# Patient Record
Sex: FEMALE | Race: ASIAN | NOT HISPANIC OR LATINO | URBAN - METROPOLITAN AREA
[De-identification: names, ages, dates, MRNs, and addresses within clinical notes are randomized per-mention and may not be internally consistent; named-entity substitution may affect disease eponyms.]

---

## 2021-04-08 ENCOUNTER — COMMUNICATION - HEALTHEAST (OUTPATIENT)
Dept: SCHEDULING | Facility: CLINIC | Age: 46
End: 2021-04-08

## 2021-06-13 ENCOUNTER — HOSPITAL ENCOUNTER (EMERGENCY)
Dept: EMERGENCY MEDICINE | Facility: HOSPITAL | Age: 46
Discharge: HOME | End: 2021-06-14
Attending: EMERGENCY MEDICINE

## 2021-06-13 DIAGNOSIS — R07.89 CHEST WALL PAIN: ICD-10-CM

## 2021-06-13 DIAGNOSIS — R07.81 RIB PAIN: ICD-10-CM

## 2021-06-13 ASSESSMENT — MIFFLIN-ST. JEOR: SCORE: 1500.44

## 2021-06-16 PROBLEM — H40.053 BILATERAL OCULAR HYPERTENSION: Status: ACTIVE | Noted: 2019-06-25

## 2021-06-16 PROBLEM — E11.9 TYPE 2 DIABETES MELLITUS, WITHOUT LONG-TERM CURRENT USE OF INSULIN (H): Status: ACTIVE | Noted: 2019-04-16

## 2021-06-25 NOTE — ED TRIAGE NOTES
"Pt arrives c/o dry cough x 2 weeks.   1 hour prior to arrival, pt coughed hard and felt a \"pop\" on right lateral ribs. Pt denies shortness of breath but pain is persisting. Worsens with movement.   "

## 2021-06-26 NOTE — ED PROVIDER NOTES
EMERGENCY DEPARTMENT ENCOUNTER      NAME: Belgica Her  AGE: 45 y.o. female  YOB: 1975  MRN: [id number]  EVALUATION DATE & TIME: 6/13/2021 11:24 PM    PCP: Provider, No Primary Care    ED PROVIDER: Ector Lay M.D.      Chief Complaint   Patient presents with     Chest Wall Pain         FINAL IMPRESSION:  1. Chest wall pain    2. Rib pain          ED COURSE & MEDICAL DECISION MAKING:    Pertinent Labs & Imaging studies reviewed. (See chart for details)    11:31 PM  I met with the patient to gather history and to perform my initial exam.  I discussed treatment options and the plan for care while in the Emergency Department.  ED Course as of Jun 14 0040   Sun Jun 13, 2021   2334 Patient is a 45-year-old woman with history of type 2 diabetes who presents with a chronic cough for the past couple of weeks with auditory pop and pain in her right anterior chest wall this evening. On exam she is comfortable as long she does not take a deep breath or cough, has tenderness to the rib cage that does not track around to the lateral or posterior aspects. She declines any analgesia, plan to get an x-ray to rule out obvious fracture or pneumothorax, otherwise there is likely tendinous or ligamentous strain or injury that is causing her pain.    [OG]   Mon Jun 14, 2021   0033 Cardiomediastinal silhouette within normal limits. No focal consolidation, pleural effusion or visible pneumothorax. Degenerative change osseous structures.   XR Chest 2 Views [OG]   0039 After the patient, we discussed possibility of rib contusion or at the very worst subtle rib fracture.  We discussed pain management, expected pain resolution over the next either 7 to 10 days or possibly 3 weeks of the latter is true.    [OG]      ED Course User Index  [OG] Ector Lay MD       At the conclusion of the encounter I discussed the results of all of the tests and the disposition. The questions were answered. The patient or family  acknowledged understanding and was agreeable with the care plan.       MEDICATIONS GIVEN IN THE EMERGENCY:  Medications - No data to display    NEW PRESCRIPTIONS STARTED AT TODAY'S ER VISIT  There are no discharge medications for this patient.           =================================================================    HPI    Patient information was obtained from: patient        Belgica Her is a 45 y.o. female with a pertinent history of DM II who presents to this ED for evaluation of cough, rib pain. Patient's had a persistent cough for the past couple of weeks, and this evening noticed when she coughs she heard a pop in her right lower chest. It was instantly painful. Since then she has had persistent 6 out of 10 pain, worse when she tries to cough or takes a deep breath. It does not radiate to the back. It can also be provoked by some twisting motions. She took Advil which helps a minimal amount. No fever.      REVIEW OF SYSTEMS   Review of Systems see HPI    PAST MEDICAL HISTORY:  No past medical history on file.    PAST SURGICAL HISTORY:  No past surgical history on file.    CURRENT MEDICATIONS:    No current facility-administered medications on file prior to encounter.      No current outpatient medications on file prior to encounter.       ALLERGIES:  No Known Allergies    FAMILY HISTORY:  No family history on file.    SOCIAL HISTORY:   Social History     Socioeconomic History     Marital status:      Spouse name: Not on file     Number of children: Not on file     Years of education: Not on file     Highest education level: Not on file   Occupational History     Not on file   Social Needs     Financial resource strain: Not on file     Food insecurity     Worry: Not on file     Inability: Not on file     Transportation needs     Medical: Not on file     Non-medical: Not on file   Tobacco Use     Smoking status: Not on file   Substance and Sexual Activity     Alcohol use: Not on file     Drug use: Not  "on file     Sexual activity: Not on file   Lifestyle     Physical activity     Days per week: Not on file     Minutes per session: Not on file     Stress: Not on file   Relationships     Social connections     Talks on phone: Not on file     Gets together: Not on file     Attends Holiness service: Not on file     Active member of club or organization: Not on file     Attends meetings of clubs or organizations: Not on file     Relationship status: Not on file     Intimate partner violence     Fear of current or ex partner: Not on file     Emotionally abused: Not on file     Physically abused: Not on file     Forced sexual activity: Not on file   Other Topics Concern     Not on file   Social History Narrative     Not on file       VITALS:  Patient Vitals for the past 24 hrs:   BP Temp Temp src Pulse Resp SpO2 Height Weight   06/14/21 0021 126/65 -- -- 82 -- 99 % -- --   06/13/21 2321 170/82 97.1  F (36.2  C) Temporal 82 18 100 % 5' 2\" (1.575 m) 200 lb (90.7 kg)       PHYSICAL EXAM    Constitutional: Well developed, well nourished. Comfortable appearing.  HENT: Normocephalic, atraumatic, wearing face mask. Neck- Supple, gross ROM intact.   Eyes: Pupils mid-range, conjunctiva without injection, no discharge.   Respiratory: Clear to auscultation bilaterally, no respiratory distress, no wheezing, speaks full sentences easily. No cough. Tenderness to right anterior ribs, no tenderness following the rib laterally.   Cardiovascular: Normal heart rate, regular rhythm, no murmurs.   GI: Soft, no tenderness to deep palpation in all quadrants, no masses.  Musculoskeletal: Moving all 4 extremities intentionally and without pain. No obvious deformity.  Skin: Warm, dry, no rash.  Neurologic: Alert & oriented x 3, cranial nerves grossly intact.  Psychiatric: Affect normal, cooperative.     LAB:  All pertinent labs reviewed and interpreted.  No results found for this visit on 06/13/21.    RADIOLOGY:  Reviewed all pertinent imaging. " Please see official radiology report.  Xr Chest 2 Views    Result Date: 6/14/2021  EXAM: XR CHEST 2 VIEWS LOCATION: [facility] DATE/TIME: 6/14/2021 12:20 AM INDICATION: R anterior rib pain after coughing - r/o pneumo or obvious fx COMPARISON: 7 2021     Cardiomediastinal silhouette within normal limits. No focal consolidation, pleural effusion or visible pneumothorax. Degenerative change osseous structures.            Ector Lay M.D.  Emergency Medicine  [facility] EMERGENCY DEPARTMENT  [address]  [facility] [id number]  Dept: [phone]  Loc: [phone]       Ector Lay MD  06/14/21 0040

## 2021-07-06 VITALS — BODY MASS INDEX: 36.8 KG/M2 | HEIGHT: 62 IN | WEIGHT: 200 LBS
